# Patient Record
Sex: FEMALE | Race: WHITE | NOT HISPANIC OR LATINO | Employment: OTHER | URBAN - METROPOLITAN AREA
[De-identification: names, ages, dates, MRNs, and addresses within clinical notes are randomized per-mention and may not be internally consistent; named-entity substitution may affect disease eponyms.]

---

## 2024-11-04 ENCOUNTER — HOSPITAL ENCOUNTER (EMERGENCY)
Facility: HOSPITAL | Age: 69
Discharge: HOME | End: 2024-11-04
Attending: STUDENT IN AN ORGANIZED HEALTH CARE EDUCATION/TRAINING PROGRAM
Payer: MEDICARE

## 2024-11-04 VITALS
SYSTOLIC BLOOD PRESSURE: 154 MMHG | WEIGHT: 134 LBS | HEART RATE: 88 BPM | BODY MASS INDEX: 25.3 KG/M2 | DIASTOLIC BLOOD PRESSURE: 78 MMHG | RESPIRATION RATE: 18 BRPM | HEIGHT: 61 IN | TEMPERATURE: 98.4 F | OXYGEN SATURATION: 96 %

## 2024-11-04 DIAGNOSIS — M54.50 ACUTE EXACERBATION OF CHRONIC LOW BACK PAIN: Primary | ICD-10-CM

## 2024-11-04 DIAGNOSIS — G89.29 ACUTE EXACERBATION OF CHRONIC LOW BACK PAIN: Primary | ICD-10-CM

## 2024-11-04 PROCEDURE — 2500000001 HC RX 250 WO HCPCS SELF ADMINISTERED DRUGS (ALT 637 FOR MEDICARE OP): Performed by: STUDENT IN AN ORGANIZED HEALTH CARE EDUCATION/TRAINING PROGRAM

## 2024-11-04 PROCEDURE — 2500000005 HC RX 250 GENERAL PHARMACY W/O HCPCS: Performed by: STUDENT IN AN ORGANIZED HEALTH CARE EDUCATION/TRAINING PROGRAM

## 2024-11-04 PROCEDURE — 2500000004 HC RX 250 GENERAL PHARMACY W/ HCPCS (ALT 636 FOR OP/ED): Performed by: STUDENT IN AN ORGANIZED HEALTH CARE EDUCATION/TRAINING PROGRAM

## 2024-11-04 PROCEDURE — 96372 THER/PROPH/DIAG INJ SC/IM: CPT | Performed by: STUDENT IN AN ORGANIZED HEALTH CARE EDUCATION/TRAINING PROGRAM

## 2024-11-04 PROCEDURE — 99283 EMERGENCY DEPT VISIT LOW MDM: CPT

## 2024-11-04 RX ORDER — LIDOCAINE 560 MG/1
1 PATCH PERCUTANEOUS; TOPICAL; TRANSDERMAL ONCE
Status: DISCONTINUED | OUTPATIENT
Start: 2024-11-04 | End: 2024-11-04 | Stop reason: HOSPADM

## 2024-11-04 RX ORDER — LIDOCAINE 50 MG/G
1 PATCH TOPICAL DAILY
Qty: 5 PATCH | Refills: 0 | Status: SHIPPED | OUTPATIENT
Start: 2024-11-04

## 2024-11-04 RX ORDER — PREDNISONE 20 MG/1
40 TABLET ORAL ONCE
Status: COMPLETED | OUTPATIENT
Start: 2024-11-04 | End: 2024-11-04

## 2024-11-04 RX ORDER — LIDOCAINE 560 MG/1
1 PATCH PERCUTANEOUS; TOPICAL; TRANSDERMAL DAILY
Status: DISCONTINUED | OUTPATIENT
Start: 2024-11-04 | End: 2024-11-04

## 2024-11-04 RX ORDER — KETOROLAC TROMETHAMINE 10 MG/1
10 TABLET, FILM COATED ORAL EVERY 6 HOURS PRN
Qty: 20 TABLET | Refills: 0 | Status: SHIPPED | OUTPATIENT
Start: 2024-11-04 | End: 2024-11-09

## 2024-11-04 RX ORDER — KETOROLAC TROMETHAMINE 15 MG/ML
15 INJECTION, SOLUTION INTRAMUSCULAR; INTRAVENOUS ONCE
Status: COMPLETED | OUTPATIENT
Start: 2024-11-04 | End: 2024-11-04

## 2024-11-04 RX ORDER — PREDNISONE 20 MG/1
40 TABLET ORAL DAILY
Qty: 10 TABLET | Refills: 0 | Status: SHIPPED | OUTPATIENT
Start: 2024-11-04 | End: 2024-11-09

## 2024-11-04 RX ORDER — CYCLOBENZAPRINE HCL 10 MG
5 TABLET ORAL ONCE
Status: COMPLETED | OUTPATIENT
Start: 2024-11-04 | End: 2024-11-04

## 2024-11-04 ASSESSMENT — PAIN SCALES - GENERAL
PAINLEVEL_OUTOF10: 5 - MODERATE PAIN
PAINLEVEL_OUTOF10: 8
PAINLEVEL_OUTOF10: 10 - WORST POSSIBLE PAIN

## 2024-11-04 ASSESSMENT — PAIN DESCRIPTION - LOCATION
LOCATION: BACK
LOCATION: BACK

## 2024-11-04 ASSESSMENT — PAIN DESCRIPTION - PAIN TYPE: TYPE: CHRONIC PAIN

## 2024-11-04 ASSESSMENT — PAIN DESCRIPTION - ORIENTATION: ORIENTATION: LOWER

## 2024-11-04 ASSESSMENT — PAIN - FUNCTIONAL ASSESSMENT
PAIN_FUNCTIONAL_ASSESSMENT: 0-10
PAIN_FUNCTIONAL_ASSESSMENT: 0-10

## 2024-11-04 NOTE — ED PROVIDER NOTES
HPI   Chief Complaint   Patient presents with    Back Pain       Patient is a 69-year-old female presenting for atraumatic low back pain.  The patient does have a history of disc disease and was following with a spinal surgeon in [place].  Recently moved back to [place] and is pending initial evaluation by spinal surgery.  Never had an operation but did receive multiple epidural injections in the past without significant improvement.  The pain now has radicular symptoms down the left lower extremity.  Denies any paresthesias or weakness.  Denies any saddle anesthesia, change in urinary habits or change in stool habits.  Denies any fevers or traumatic injury.              Patient History   Past Medical History:   Diagnosis Date    Other conditions influencing health status     No significant past medical history     Past Surgical History:   Procedure Laterality Date    COLONOSCOPY  06/12/2017    Colonoscopy     No family history on file.  Social History     Tobacco Use    Smoking status: Not on file    Smokeless tobacco: Not on file   Substance Use Topics    Alcohol use: Not on file    Drug use: Not on file       Physical Exam   ED Triage Vitals [11/04/24 0515]   Temperature Heart Rate Respirations BP   36.8 °C (98.2 °F) 90 18 172/83      Pulse Ox Temp src Heart Rate Source Patient Position   96 % -- -- --      BP Location FiO2 (%)     -- --       Physical Exam  Constitutional:       General: She is not in acute distress.     Appearance: She is not toxic-appearing.   Musculoskeletal:      Comments: Tenderness to palpation over the lower midline lumbar area without obvious deformity.  Full range of motion of the back with flexion and extension.  Appropriate rotational range of motion as well.   Neurological:      Mental Status: She is alert.      Comments: Normal strength and sensation of bilateral lower extremities, ambulatory without difficulty, normal patellar reflex bilaterally.           ED Course & MDM   Diagnoses  as of 11/04/24 0558   Acute exacerbation of chronic low back pain                 No data recorded                                 Medical Decision Making  Patient is a 69-year-old female presenting for atraumatic low back pain.  Given the normal neurologic examination I am less concerned about cauda equina symptoms.  The patient is able to ambulate without difficulty.  Does have prescriptions for oxycodone and baclofen at home but was not improving her symptoms.  No traumatic injury suggestive of acute fracture requiring imaging at this time.  Patient was treated with multimodal pain medication with mild relief of symptoms.  She was encouraged to call her spinal surgeon to move up their appointment.        Procedure  Procedures     Osmani Samano MD  11/04/24 0683

## 2024-11-07 ENCOUNTER — HOSPITAL ENCOUNTER (EMERGENCY)
Facility: HOSPITAL | Age: 69
Discharge: HOME | End: 2024-11-07
Attending: STUDENT IN AN ORGANIZED HEALTH CARE EDUCATION/TRAINING PROGRAM
Payer: MEDICARE

## 2024-11-07 VITALS
BODY MASS INDEX: 25.3 KG/M2 | OXYGEN SATURATION: 99 % | TEMPERATURE: 96.8 F | SYSTOLIC BLOOD PRESSURE: 186 MMHG | WEIGHT: 134 LBS | HEIGHT: 61 IN | RESPIRATION RATE: 15 BRPM | DIASTOLIC BLOOD PRESSURE: 111 MMHG | HEART RATE: 95 BPM

## 2024-11-07 DIAGNOSIS — G89.29 CHRONIC BILATERAL LOW BACK PAIN WITHOUT SCIATICA: Primary | ICD-10-CM

## 2024-11-07 DIAGNOSIS — M54.50 CHRONIC BILATERAL LOW BACK PAIN WITHOUT SCIATICA: Primary | ICD-10-CM

## 2024-11-07 PROCEDURE — 99283 EMERGENCY DEPT VISIT LOW MDM: CPT | Mod: 25

## 2024-11-07 PROCEDURE — 96372 THER/PROPH/DIAG INJ SC/IM: CPT | Performed by: STUDENT IN AN ORGANIZED HEALTH CARE EDUCATION/TRAINING PROGRAM

## 2024-11-07 PROCEDURE — 2500000004 HC RX 250 GENERAL PHARMACY W/ HCPCS (ALT 636 FOR OP/ED): Performed by: STUDENT IN AN ORGANIZED HEALTH CARE EDUCATION/TRAINING PROGRAM

## 2024-11-07 PROCEDURE — 2500000001 HC RX 250 WO HCPCS SELF ADMINISTERED DRUGS (ALT 637 FOR MEDICARE OP)

## 2024-11-07 RX ORDER — ACETAMINOPHEN 325 MG/1
650 TABLET ORAL ONCE
Status: COMPLETED | OUTPATIENT
Start: 2024-11-07 | End: 2024-11-07

## 2024-11-07 RX ORDER — LIDOCAINE 50 MG/G
1 PATCH TOPICAL DAILY
Qty: 15 PATCH | Refills: 0 | Status: SHIPPED | OUTPATIENT
Start: 2024-11-07

## 2024-11-07 RX ORDER — KETOROLAC TROMETHAMINE 15 MG/ML
15 INJECTION, SOLUTION INTRAMUSCULAR; INTRAVENOUS ONCE
Status: COMPLETED | OUTPATIENT
Start: 2024-11-07 | End: 2024-11-07

## 2024-11-07 ASSESSMENT — PAIN SCALES - GENERAL
PAINLEVEL_OUTOF10: 8
PAINLEVEL_OUTOF10: 0 - NO PAIN
PAINLEVEL_OUTOF10: 8
PAINLEVEL_OUTOF10: 8

## 2024-11-07 ASSESSMENT — PAIN DESCRIPTION - LOCATION
LOCATION: BACK

## 2024-11-07 ASSESSMENT — PAIN DESCRIPTION - ORIENTATION: ORIENTATION: LEFT;LOWER

## 2024-11-07 ASSESSMENT — COLUMBIA-SUICIDE SEVERITY RATING SCALE - C-SSRS
6. HAVE YOU EVER DONE ANYTHING, STARTED TO DO ANYTHING, OR PREPARED TO DO ANYTHING TO END YOUR LIFE?: NO
1. IN THE PAST MONTH, HAVE YOU WISHED YOU WERE DEAD OR WISHED YOU COULD GO TO SLEEP AND NOT WAKE UP?: NO
2. HAVE YOU ACTUALLY HAD ANY THOUGHTS OF KILLING YOURSELF?: NO

## 2024-11-07 ASSESSMENT — PAIN SCALES - PAIN ASSESSMENT IN ADVANCED DEMENTIA (PAINAD): TOTALSCORE: MEDICATION (SEE MAR)

## 2024-11-07 ASSESSMENT — PAIN - FUNCTIONAL ASSESSMENT: PAIN_FUNCTIONAL_ASSESSMENT: 0-10

## 2024-11-07 NOTE — ED TRIAGE NOTES
Acute on chronic back pain r/t known disc issue. Pt is scheduled to see spine surgeon 12/3 but ran out of her pain med today. Was given 21 tabs of Percocet 10/28. Was here 0400 Monday this week.

## 2024-11-07 NOTE — ED PROVIDER NOTES
History of Present Illness     History provided by: Patient  Limitations to History: None  External Records Reviewed with Brief Summary:  Outpatient ophthalmology visit from 4/15/2022 was reviewed    HPI:  Mer Monroe is a 69 y.o. female with a past medical history of chronic back pain who is presenting today for evaluation of patient's chronic back pain.  Patient reports that has been going on for a while.  Patient was seen for disc disease by a spinal surgeon.  Patient was reporting that her symptoms have improved since being on oxycodone, baclofen, Toradol and a lidocaine patch.  Patient reports that she has follow-up scheduled for .  She reports that her doctor in [place] will not prescribe her additional medication.  Denies urinary incontinence, weakness, numbness, bowel bladder incontinence.    Physical Exam   Triage vitals:  T 37.1 °C (98.8 °F)  HR 91  /78  RR 18  O2 97 % None (Room air)    General: Awake, alert, in no acute distress  Eyes: Gaze conjugate.  No scleral icterus or injection  HENT: Normo-cephalic, atraumatic. No stridor  CV: Regular rate, regular rhythm. Radial pulses 2+ bilaterally  Resp: Breathing non-labored, speaking in full sentences.  Clear to auscultation bilaterally  GI: Soft, non-distended, non-tender. No rebound or guarding.  MSK/Extremities: No gross bony deformities. Moving all extremities  Skin: Warm. Appropriate color  Neuro: Alert. Oriented. Face symmetric. Speech is fluent.  Gross strength and sensation intact in b/l UE and LEs  Psych: Appropriate mood and affect      Medical Decision Making & ED Course   Medical Decision Makin y.o. female with a past medical history of chronic back pain who is presenting today for evaluation patient's chronic back pain.  Mer Monroe is a 69 y.o. year old female who is presenting with back pain. No new numbness/weakness/tingling in legs, bowel/bladder incontinence, new trauma, fever, unexpected weight loss, h/o cancer,  h/o IVDU, or indwelling lines. No midline CT LS spine tenderness palpation or step-offs.  5 out of 5 strength in hip and knee flexion extension as well as ankle and great toe plantar dorsiflexion bilaterally.  Normal sensation to light touch in L1-S4 nerve roots bilaterally.  2+ DP pulses bilaterally.  No concern for osteomyelitis, discitis, epidural abscess, cord compression, fracture, or cauda equina.  I do not believe they require MRI or CT imaging at this time. Will not represcribe patient's baclofen, toradol, prednisone or oxycodone.  Patient reports improvement. Patient was discharged home in stable condition. Patient was advised to follow up with their PCP in 1 week. Patient was advised to return if bowel or bladder incontinence, inability to ambulate or weakness or loss of sensation.     ----      Differential diagnoses considered include but are not limited to: see MDM     Social Determinants of Health which Significantly Impact Care: None identified     EKG Independent Interpretation: EKG interpreted by myself. Please see ED Course and MDM for full interpretation.    Independent Result Review and Interpretation: Results were independently reviewed and interpreted by myself. Please see ED course and [facility] for full interpretation.    Chronic conditions affecting the patient's care: As documented in the MDM    The patient was discussed with the following consultants/services: None    Care Considerations: As per [facility]    ED Course:  Diagnoses as of 11/07/24 1752   Chronic bilateral low back pain without sciatica     Disposition   As a result of the work-up, the patient was discharged home.  she was informed of her diagnosis and instructed to come back with any concerns or worsening of condition.  she and was agreeable to the plan as discussed above.  she was given the opportunity to ask questions.  All of the patient's questions were answered.    Procedures   Procedures    Patient seen and discussed with ED  attending physician.    Michaelle Ugalde MD  Emergency Medicine     Michaelle Ugalde MD  Resident  11/07/24 9199

## 2024-11-07 NOTE — DISCHARGE INSTRUCTIONS
You are seen today for chronic lower back pain.  You need to follow-up with your surgeon for pain management.  We have given a referral for pain management as well.

## 2024-11-13 ENCOUNTER — OFFICE VISIT (OUTPATIENT)
Dept: PAIN MEDICINE | Facility: HOSPITAL | Age: 69
End: 2024-11-13
Payer: MEDICARE

## 2024-11-13 DIAGNOSIS — M54.16 LUMBAR RADICULOPATHY: Primary | ICD-10-CM

## 2024-11-13 DIAGNOSIS — M48.061 LUMBAR STENOSIS WITHOUT NEUROGENIC CLAUDICATION: ICD-10-CM

## 2024-11-13 PROCEDURE — 1125F AMNT PAIN NOTED PAIN PRSNT: CPT | Performed by: PAIN MEDICINE

## 2024-11-13 PROCEDURE — 99214 OFFICE O/P EST MOD 30 MIN: CPT | Performed by: PAIN MEDICINE

## 2024-11-13 PROCEDURE — 99204 OFFICE O/P NEW MOD 45 MIN: CPT | Performed by: PAIN MEDICINE

## 2024-11-13 RX ORDER — OXYCODONE HYDROCHLORIDE 5 MG/1
5 TABLET ORAL 2 TIMES DAILY PRN
Qty: 14 TABLET | Refills: 0 | Status: SHIPPED | OUTPATIENT
Start: 2024-11-13 | End: 2024-11-20

## 2024-11-13 ASSESSMENT — PAIN - FUNCTIONAL ASSESSMENT: PAIN_FUNCTIONAL_ASSESSMENT: 0-10

## 2024-11-13 ASSESSMENT — PAIN DESCRIPTION - DESCRIPTORS: DESCRIPTORS: SHOOTING;NUMBNESS;TINGLING

## 2024-11-13 ASSESSMENT — PAIN SCALES - GENERAL: PAINLEVEL_OUTOF10: 7

## 2024-11-13 NOTE — PROGRESS NOTES
Subjective   Patient ID: Mer Monroe is a 69 y.o. female with a past medical history of chronic low back pain, lumbar radiculopathy, who presents to us for evaluation of her low back pain.  Pain is described as sharp shooting in the lower back, traveling posterior laterally through the left buttock and left thigh to the left leg, with accompanying tingling sensation on the dorsal surface of the left foot.  Pain is worse with leaning on the left side, prolonged standing, prolonged walking.  Pain is better when she leans forward and support. Patient states that she has had low back pain for several months, and was following with a pain management physician in [place] for management of this pain.  She previously attempted medications including acetaminophen, NSAIDs, gabapentin, without any benefits.  She has been to physical therapy without significant relief of symptoms.  She described what appears to be an S1 nerve root injection with good relief lasting about 6 weeks, followed by L5 transforaminal epidural steroid injection which did not provide any relief.  She then saw a neurosurgeon down in [place], who recommended surgery to her.  She declined to have surgery down there, preferring to have surgery up here in [place].        Review of Systems   13-point ROS done and negative except for HPI.     Current Outpatient Medications   Medication Instructions    lidocaine (Lidoderm) 5 % patch 1 patch, transdermal, Daily, Remove & discard patch within 12 hours or as directed by MD.    lidocaine (Lidoderm) 5 % patch 1 patch, transdermal, Daily, Remove & discard patch within 12 hours or as directed by MD.       Past Medical History:   Diagnosis Date    Other conditions influencing health status     No significant past medical history        Past Surgical History:   Procedure Laterality Date    COLONOSCOPY  06/12/2017    Colonoscopy        No family history on file.     Allergies   Allergen Reactions    Codeine Nausea  Only and Nausea/vomiting    Hydrocodone Swelling        Objective     There were no vitals filed for this visit.     Physical Exam  General: NAD, well groomed, well nourished  Eyes: Non-icteric sclera, EOMI  Ears, Nose, Mouth, and Throat: External ears and nose appear to be without deformity or rash. No lesions or masses noted. Hearing is grossly intact.   Neck: Trachea midline  Respiratory: Nonlabored breathing   Cardiovascular: No peripheral edema observed  Skin: No rashes or open lesions/ulcers identified on skin.  Psychiatric: Alert, orientation to person, place, and time. Cooperative.    Neurologic:   Cranial nerves grossly intact.   Strength: 5/5 and symmetric plantar/dorsiflexion   Sensation: Normal to light touch and pinprick intact throughout, except diminished in posterior lateral left lower extremity.  DTRs:normal and symmetric throughout    Back:   Palpation: Tenderness to palpation over lumbar paraspinous muscles.   Straight leg raise: positive at 30 degrees on the left  NEYDA Maneuver does not reproduce pain bilaterally  Facet Loading test: absent on lumbar spine    Hip: No pain over greater trochanters. and Pain not reproduced with hip internal/external rotation.     Imaging personally reviewed and independently interpreted:   No results found for this or any previous visit from the past 1000 days.     No image results found.     1. Chronic bilateral low back pain without sciatica  Referral to Pain Medicine           Assessment/Plan   Mer Monroe is a 69 y.o. female with a past medical history of chronic low back pain, lumbar radiculopathy, who presents to us for evaluation of her low back pain.  Pain is described as sharp shooting in the lower back, traveling posterior laterally through the left buttock and left thigh to the left leg, with accompanying tingling sensation on the dorsal surface of the left foot.  Pain is worse with leaning on the left side, prolonged standing, prolonged walking.   Pain is better when she leans forward and support. It appears patient is with acute flareup of her lumbar radicular pain.  She previously saw a spine surgeon in [place] and was advised to have surgery, however she did not want to have it done down there.  She instead prefer to have surgery with a surgeon here in [place].  She has an appointment to see the spine surgeon on 12/3/2024.  For the time being, she does not want to have any interventions with us such as epidural steroid injections, as her last one failed.    Plan:  - We encouraged the patient to continue physician directed home physical therapy exercises as tolerated.  - Short prescription of oxycodone 5 mg twice daily for 7 days provided in the setting of an acute flareup of her lumbar radicular pain.  This is until she can see her surgeon.  - Patient states that she is scheduled to see her spine surgeon on 12/3/2024, and has decided to pursue surgical options if presented to her.    Follow up: As needed     The patient was invited to contact us back anytime with any questions or concerns and follow-up with us in the office as needed.     Diagnoses and all orders for this visit:  Chronic bilateral low back pain without sciatica  -     Referral to Pain Medicine      This note was generated with the aid of dictation software, there may be typos despite my attempts at proofreading.     Linda Rick MD  Interventional Pain Medicine Fellow  [facility]